# Patient Record
Sex: MALE | Race: WHITE | URBAN - METROPOLITAN AREA
[De-identification: names, ages, dates, MRNs, and addresses within clinical notes are randomized per-mention and may not be internally consistent; named-entity substitution may affect disease eponyms.]

---

## 2017-05-04 ENCOUNTER — OFFICE VISIT (OUTPATIENT)
Dept: FAMILY MEDICINE CLINIC | Age: 47
End: 2017-05-04

## 2017-05-04 VITALS
HEART RATE: 87 BPM | WEIGHT: 206.6 LBS | BODY MASS INDEX: 30.6 KG/M2 | DIASTOLIC BLOOD PRESSURE: 81 MMHG | TEMPERATURE: 98.4 F | SYSTOLIC BLOOD PRESSURE: 114 MMHG | HEIGHT: 69 IN | RESPIRATION RATE: 18 BRPM | OXYGEN SATURATION: 96 %

## 2017-05-04 DIAGNOSIS — Z23 ENCOUNTER FOR IMMUNIZATION: ICD-10-CM

## 2017-05-04 DIAGNOSIS — Z00.00 ANNUAL PHYSICAL EXAM: Primary | ICD-10-CM

## 2017-05-04 NOTE — PROGRESS NOTES
Chief Complaint   Patient presents with    Complete Physical     no concerns today. . just time to get a check up. . no med history. Mia harvey needed. . refused pneumo vaccine. .      1. Have you been to the ER, urgent care clinic since your last visit? Hospitalized since your last visit? No    2. Have you seen or consulted any other health care providers outside of the [address] [place] [address] since your last visit? Include any pap smears or colon screening.  No

## 2017-05-04 NOTE — MR AVS SNAPSHOT
Visit Information Date & Time Provider Department Dept. Phone Encounter #  
 5/4/2017  4:00 PM Jackson Coleman, Maninder Alcantar [phone] [id number] Upcoming Health Maintenance Date Due Pneumococcal 19-64 Medium Risk (1 of 1 - PPSV23) 8/26/1987 INFLUENZA AGE 9 TO ADULT 8/1/2017 DTaP/Tdap/Td series (2 - Td) 5/4/2027 Allergies as of 5/4/2017  Review Complete On: 5/4/2017 By: Jackson Jaffe MD  
 No Known Allergies Current Immunizations  Reviewed on 5/4/2017 Name Date Td, Adsorbed PF 6/16/2015 Tdap 5/4/2017 Reviewed by Stas Saldana LPN on 8/7/7548 at  5:27 PM  
You Were Diagnosed With   
  
 Codes Comments Annual physical exam    -  Primary ICD-10-CM: Z00.00 ICD-9-CM: V70.0 Encounter for immunization     ICD-10-CM: H54 ICD-9-CM: V03.89 Vitals BP Pulse Temp Resp Height(growth percentile) Weight(growth percentile) 114/81 87 98.4 °F (36.9 °C) (Oral) 18 5' 9\" (1.753 m) 206 lb 9.6 oz (93.7 kg) SpO2 BMI Smoking Status 96% 30.51 kg/m2 Current Every Day Smoker BMI and BSA Data Body Mass Index Body Surface Area 30.51 kg/m 2 2.14 m 2 Preferred Pharmacy Pharmacy Name Phone [facility] [place], [address] AT [facility] [phone] Your Updated Medication List  
  
   
This list is accurate as of: 5/4/17  4:45 PM.  Always use your most recent med list.  
  
  
  
  
 GOODY'S BODY PAIN 500-325 mg Pack Generic drug:  aspirin-acetaminophen Take  by mouth. We Performed the Following CBC W/O DIFF [72496 CPT(R)] LIPID PANEL [64665 CPT(R)] METABOLIC PANEL, COMPREHENSIVE [20263 CPT(R)] TETANUS, DIPHTHERIA TOXOIDS AND ACELLULAR PERTUSSIS VACCINE (TDAP), IN INDIVIDS. >=7, IM [id number] CPT(R)] TSH 3RD GENERATION [03869 CPT(R)] [facility]! Kyler Cooper introduces Clar[employer] patient portal. Now you can access parts of your medical record, email your doctor's office, and request medication refills online. 1. In your internet browser, go to [email or web address] 2. Click on the First Time User? Click Here link in the Sign In box. You will see the New Member Sign Up page. 3. Enter your Clariture Access Code exactly as it appears below. You will not need to use this code after youve completed the sign-up process. If you do not sign up before the expiration date, you must request a new code. · Clariture Access Code: [email or web address] Expires: 8/2/2017  4:45 PM 
 
4. Enter the last four digits of your Social Security Number (xxxx) and Date of Birth (mm/dd/yyyy) as indicated and click Submit. You will be taken to the next sign-up page. 5. Create a Clariture ID. This will be your Clariture login ID and cannot be changed, so think of one that is secure and easy to remember. 6. Create a Clariture password. You can change your password at any time. 7. Enter your Password Reset Question and Answer. This can be used at a later time if you forget your password. 8. Enter your e-mail address. You will receive e-mail notification when new information is available in [address] 9. Click Sign Up. You can now view and download portions of your medical record. 10. Click the Download Summary menu link to download a portable copy of your medical information. If you have questions, please visit the Frequently Asked Questions section of the Clariture website. Remember, Clariture is NOT to be used for urgent needs. For medical emergencies, dial 911. Now available from your iPhone and Android! Please provide this summary of care documentation to your next provider. Your primary care clinician is listed as Bel Hodgson. If you have any questions after today's visit, please call [phone].

## 2017-05-04 NOTE — PROGRESS NOTES
Venu Nguyễn is a 48 y.o. male who presents for annual physical.  No complaints except some stress related to his job closing in 45 days and he will be out of work. He reports walking for exercise. Sleep is usually good but harder time falling asleep recently due to stress from job issues. He smokes about 4-10 cigarettes a day. Drinks 3-8 beers about 4 days a week. He has never considered cutting back and does not feel as though the drinking is a problem. He feels his diet is not very healthy with mostly fast food but is trying to improve his diet. His wife is helping him with healthier choices. PMHx:  History reviewed. No pertinent past medical history. Meds:   Current Outpatient Prescriptions   Medication Sig Dispense Refill    aspirin-acetaminophen (GOODY'S BODY PAIN) 500-325 mg pack Take  by mouth.          Allergies:   No Known Allergies    Smoker:  History   Smoking Status    Current Every Day Smoker    Packs/day: 0.50   Smokeless Tobacco    Not on file       ETOH:   History   Alcohol Use    1.8 - 2.4 oz/week    3 - 4 Cans of beer per week       FH:   Family History   Problem Relation Age of Onset    Cancer Maternal Grandfather        ROS:  General/Constitutional:   No headache, fever, fatigue, weight loss or weight gain       Eyes:   No redness, pruritis, pain, visual changes, swelling, or discharge      Ears:    No pain, loss or changes in hearing     Neck:   No swelling, masses, stiffness, pain, or limited movement     Cardiac:    No chest pain      Respiratory:   No cough or shortness of breath     GI:   No nausea/vomiting, diarrhea, abdominal pain, bloody or dark stools       :   No dysuria or  hematuria    Neurological:   No loss of consciousness, dizziness, seizures, dysarthria, cognitive changes, memory changes,  problems with balance, or unilateral weakness     Skin: No rash     Physical Exam:  Visit Vitals    /81    Pulse 87    Temp 98.4 °F (36.9 °C) (Oral)    Resp 18    Ht 5' 9\" (1.753 m)    Wt 206 lb 9.6 oz (93.7 kg)    SpO2 96%    BMI 30.51 kg/m2     GEN: No apparent distress. Alert and oriented and responds to all questions appropriately. EYES:  Conjunctiva clear; pupils round and reactive to light; extraocular movements are intact. EAR: External ears are normal.  Tympanic membranes are clear and without effusion. NOSE: Turbinates are within normal limits. No drainage  OROPHYARYNX: No oral lesions or exudates. NECK:  Supple; no masses; thyroid normal           LUNGS: Respirations unlabored; clear to auscultation bilaterally  CARDIOVASCULAR: Regular, rate, and rhythm without murmurs, gallops or rubs   ABDOMEN: Soft; nontender; nondistended; normoactive bowel sounds; no masses or organomegaly  NEUROLOGIC:  No focal neurologic deficits. Strength and sensation grossly intact. Coordination and gait grossly intact. EXT: Well perfused. No edema. SKIN: No obvious rashes. Assessment:    ICD-10-CM ICD-9-CM    1. Annual physical exam Z00.00 V70.0 CBC W/O DIFF      METABOLIC PANEL, COMPREHENSIVE      LIPID PANEL      TSH 3RD GENERATION   2. Encounter for immunization Z23 V03.89 TETANUS, DIPHTHERIA TOXOIDS AND ACELLULAR PERTUSSIS VACCINE (TDAP), IN INDIVIDS. >=7, IM       Plan:  Labs as above. Discussed diet and exercise  Encouraged smoking cessation and cutting back on ETOH use. He states he is not ready to cut back on either and does not think that either are a problem currently. Discuss long term health issues regarding each and encouraged cessation. TDaP given    RTC: 1 year and PRN.

## 2020-05-07 ENCOUNTER — LAB ONLY (OUTPATIENT)
Dept: FAMILY MEDICINE CLINIC | Age: 50
End: 2020-05-07

## 2020-05-07 ENCOUNTER — TELEPHONE (OUTPATIENT)
Dept: FAMILY MEDICINE CLINIC | Age: 50
End: 2020-05-07

## 2020-05-07 ENCOUNTER — HOSPITAL ENCOUNTER (OUTPATIENT)
Dept: LAB | Age: 50
Discharge: HOME | End: 2020-05-07

## 2020-05-07 ENCOUNTER — VIRTUAL VISIT (OUTPATIENT)
Dept: FAMILY MEDICINE CLINIC | Age: 50
End: 2020-05-07

## 2020-05-07 DIAGNOSIS — M79.671 RIGHT FOOT PAIN: Primary | ICD-10-CM

## 2020-05-07 DIAGNOSIS — Z00.00 HEALTHCARE MAINTENANCE: ICD-10-CM

## 2020-05-07 DIAGNOSIS — M79.89 SWELLING OF RIGHT FOOT: ICD-10-CM

## 2020-05-07 LAB
ALBUMIN SERPL-MCNC: 4.1 G/DL (ref 3.5–5)
ALBUMIN/GLOB SERPL: 1.9 {RATIO} (ref 1.1–2.2)
ALP SERPL-CCNC: 80 U/L (ref 45–117)
ALT SERPL-CCNC: 24 U/L (ref 12–78)
ANION GAP SERPL CALC-SCNC: 2 MMOL/L (ref 5–15)
AST SERPL-CCNC: 12 U/L (ref 15–37)
BASOPHILS # BLD: 0 K/UL (ref 0–0.1)
BASOPHILS NFR BLD: 0 % (ref 0–1)
BILIRUB SERPL-MCNC: 0.5 MG/DL (ref 0.2–1)
BUN SERPL-MCNC: 18 MG/DL (ref 6–20)
BUN/CREAT SERPL: 18 (ref 12–20)
CALCIUM SERPL-MCNC: 9.1 MG/DL (ref 8.5–10.1)
CHLORIDE SERPL-SCNC: 106 MMOL/L (ref 97–108)
CHOLEST SERPL-MCNC: 169 MG/DL
CO2 SERPL-SCNC: 30 MMOL/L (ref 21–32)
CREAT SERPL-MCNC: 1 MG/DL (ref 0.7–1.3)
DIFFERENTIAL METHOD BLD: NORMAL
EOSINOPHIL # BLD: 0.2 K/UL (ref 0–0.4)
EOSINOPHIL NFR BLD: 4 % (ref 0–7)
ERYTHROCYTE [DISTWIDTH] IN BLOOD BY AUTOMATED COUNT: 12.5 % (ref 11.5–14.5)
EST. AVERAGE GLUCOSE BLD GHB EST-MCNC: 94 MG/DL
GLOBULIN SER CALC-MCNC: 2.2 G/DL (ref 2–4)
GLUCOSE SERPL-MCNC: 87 MG/DL (ref 65–100)
HBA1C MFR BLD: 4.9 % (ref 4–5.6)
HCT VFR BLD AUTO: 46.7 % (ref 36.6–50.3)
HDLC SERPL-MCNC: 47 MG/DL
HDLC SERPL: 3.6 {RATIO} (ref 0–5)
HGB BLD-MCNC: 15.5 G/DL (ref 12.1–17)
IMM GRANULOCYTES # BLD AUTO: 0 K/UL (ref 0–0.04)
IMM GRANULOCYTES NFR BLD AUTO: 0 % (ref 0–0.5)
LDLC SERPL CALC-MCNC: 75.6 MG/DL (ref 0–100)
LIPID PROFILE,FLP: ABNORMAL
LYMPHOCYTES # BLD: 1.5 K/UL (ref 0.8–3.5)
LYMPHOCYTES NFR BLD: 26 % (ref 12–49)
MCH RBC QN AUTO: 31.4 PG (ref 26–34)
MCHC RBC AUTO-ENTMCNC: 33.2 G/DL (ref 30–36.5)
MCV RBC AUTO: 94.5 FL (ref 80–99)
MONOCYTES # BLD: 0.5 K/UL (ref 0–1)
MONOCYTES NFR BLD: 8 % (ref 5–13)
NEUTS SEG # BLD: 3.5 K/UL (ref 1.8–8)
NEUTS SEG NFR BLD: 62 % (ref 32–75)
NRBC # BLD: 0 K/UL (ref 0–0.01)
NRBC BLD-RTO: 0 PER 100 WBC
PLATELET # BLD AUTO: 171 K/UL (ref 150–400)
PMV BLD AUTO: 10.4 FL (ref 8.9–12.9)
POTASSIUM SERPL-SCNC: 4.3 MMOL/L (ref 3.5–5.1)
PROT SERPL-MCNC: 6.3 G/DL (ref 6.4–8.2)
RBC # BLD AUTO: 4.94 M/UL (ref 4.1–5.7)
SODIUM SERPL-SCNC: 138 MMOL/L (ref 136–145)
TRIGL SERPL-MCNC: 232 MG/DL (ref ?–150)
VLDLC SERPL CALC-MCNC: 46.4 MG/DL
WBC # BLD AUTO: 5.6 K/UL (ref 4.1–11.1)

## 2020-05-07 NOTE — TELEPHONE ENCOUNTER
Reviewed Xray findings  - Advised patint to perform ice therapy: apply cold (wrap in wet thin towel an ice pack or bag of frozen ligia or immersed in 55 F cool water), Ice massage rubbing ice over heel in circular motion for 5-10 minutes hourly for 2-4 hours. - Relative rest, avoiding irritating activities,   - Orthotics  - New running shoes  - Dynamic stretches: Roll foot arch over Tennis Ball  - Plantar fascia stretch  - Achilles tendon stretches  - Towl curls      Tendinitis, plantar fasciitis, hell pad dysfunction tarsal tunnel syndrome    Soft tissues are normal. Bones are well-mineralized. Plantar and posterior  calcaneal spurring is noted.

## 2020-05-07 NOTE — PROGRESS NOTES
Yu Pena  44 y.o. male  1970  [address]   [id number]   [address]:    Telemedicine Progress Note  Lauren King MD       Encounter Date and Time: May 7, 2020 at 11:12 AM    Consent:  He and/or the health care decision maker is aware that that he may receive a bill for this telephone service, depending on his insurance coverage, and has provided verbal consent to proceed: Yes    No chief complaint on file. History of Present Illness   Tamar Gitelman is a 44 y.o. male was evaluated by synchronous (real-time) audio-video technology from home, through the [employer] Patient Portal.    Patient states that for the past couple of months has been having right foot pain. States he typically jumps from roof to roof and to the floor about 3-4 ft in his job. Unable to recall exact event that caused the foot pain. States pain has progressively become worse and is associated with swelling. Pain is now \"excrutiating\". Aggravated with walking and standing, relived with rest. States Ibuprofen 200 mg relieved pain however now has to take 200 mg of ibuprofen in addition to naproxen. States he feels the most pain between arch and ankle and occasionally on the heel. Tried taking one week off from work however has not improved pain. Denies any bruising or calf pain. Of note patient drinks about 40 beers per week. Covid screening: Patient denies any cough, sore throat, fever, recent travel or sick contacts. Patient works at [employer]. Review of Systems   Review of Systems   Constitutional: Negative for chills and fever. HENT: Negative for congestion and sore throat. Eyes: Negative for blurred vision and double vision. Respiratory: Negative for cough, hemoptysis, sputum production, shortness of breath and wheezing. Cardiovascular: Negative for chest pain, palpitations and leg swelling.    Gastrointestinal: Negative for abdominal pain, blood in stool, constipation, diarrhea, heartburn, melena, nausea and vomiting. Genitourinary: Negative for dysuria and urgency. Musculoskeletal: Negative for back pain and joint pain. Right foot pain and right foot swelling. Skin: Negative for rash. Neurological: Negative for dizziness, focal weakness and headaches. Psychiatric/Behavioral: Negative for suicidal ideas. Vitals/Objective:     General: alert, cooperative, no distress   Mental  status: mental status: alert, oriented to person, place, and time, normal mood, behavior, speech, dress, motor activity, and thought processes   Resp: resp: normal effort and no respiratory distress   Neuro: neuro: no gross deficits   Skin: skin: no discoloration or lesions of concern on visible areas   Due to this being a TeleHealth evaluation, many elements of the physical examination are unable to be assessed. Assessment and Plan:   Time-based coding, delete if not needed: I spent at least 15 minutes with this established patient, and >50% of the time was spent counseling and/or coordinating care regarding right foot pain and swelling. Assessment/Plan:  Right foot pain and swelling likely secondary to stress fracture:  - Scheduled lab/xray appointment for patient at 1 pm.   - XR FOOT RT MIN 3 V; Future  - XR ANKLE RT MIN 3 V; Future  - Will call patient with results and for plan moving forward. - Patient was advised to minimize NSAID use and reviewed potential side effects. 2. Healthcare maintenance  - Will obtain labs given patient will be going to clinic and to avoid additional clinic visit. Has never had labs before. - CBC WITH AUTOMATED DIFF; Future  - METABOLIC PANEL, COMPREHENSIVE; Future  - LIPID PANEL; Future  - HEMOGLOBIN A1C WITH EAG; Future    3. ETOH abuse: Patient reports drinking 40 beers per week. - Patient was extensively counseled on risks of ETOH consumption.  Was advised to refrain given increased risk of GI bleed with NSAID use. - Patient states it is not a problem and some days doesn't drink at all. Time spent in direct conversation with the patient to include medical condition(s) discussed, assessment and treatment plan:       We discussed the expected course, resolution and complications of the diagnosis(es) in detail. Medication risks, benefits, costs, interactions, and alternatives were discussed as indicated. I advised him to contact the office if his condition worsens, changes or fails to improve as anticipated. He expressed understanding with the diagnosis(es) and plan. Patient understands that this encounter was a temporary measure, and the importance of further follow up and examination was emphasized. Patient verbalized understanding. Patient informed to follow up: in one week. Patient was discussed with Dr. Cole Dewey, Attending Physician. Electronically Signed: Little Nesbitt MD    Providers location when delivering service ( home)    CPT Codes 48286-60494 for Established Patients may apply to this Telehealth Visit. POS code: 18. Modifier GT      Pursuant to the emergency declaration under the [facility], [facility] waiver authority and the [employer] and [employer] Act, this Virtual  Visit was conducted, with patient's consent, to reduce the patient's risk of exposure to COVID-19 and provide continuity of care for an established patient. Services were provided through a video synchronous discussion virtually to substitute for in-person clinic visit. History   Patients past medical, surgical and family histories were reviewed and updated. No past medical history on file. No past surgical history on file.   Family History   Problem Relation Age of Onset    Cancer Maternal Grandfather      Social History     Socioeconomic History    Marital status:      Spouse name: Not on file    Number of children: Not on file    Years of education: Not on file    Highest education level: Not on file   Occupational History    Not on file   Social Needs    Financial resource strain: Not on file    Food insecurity     Worry: Not on file     Inability: Not on file    Transportation needs     Medical: Not on file     Non-medical: Not on file   Tobacco Use    Smoking status: Current Every Day Smoker     Packs/day: 0.50   Substance and Sexual Activity    Alcohol use: Yes     Alcohol/week: 3.0 - 4.0 standard drinks     Types: 3 - 4 Cans of beer per week    Drug use: No    Sexual activity: Yes     Partners: Female   Lifestyle    Physical activity     Days per week: Not on file     Minutes per session: Not on file    Stress: Not on file   Relationships    Social connections     Talks on phone: Not on file     Gets together: Not on file     Attends Quaker service: Not on file     Active member of club or organization: Not on file     Attends meetings of clubs or organizations: Not on file     Relationship status: Not on file    Intimate partner violence     Fear of current or ex partner: Not on file     Emotionally abused: Not on file     Physically abused: Not on file     Forced sexual activity: Not on file   Other Topics Concern    Not on file   Social History Narrative    Not on file     There is no problem list on file for this patient. Current Medications/Allergies   Medications and Allergies reviewed:    Current Outpatient Medications   Medication Sig Dispense Refill    aspirin-acetaminophen (GOODY'S BODY PAIN) 500-325 mg pack Take  by mouth.        No Known Allergies

## 2020-05-15 ENCOUNTER — TELEPHONE (OUTPATIENT)
Dept: FAMILY MEDICINE CLINIC | Age: 50
End: 2020-05-15

## 2020-05-15 DIAGNOSIS — M72.2 PLANTAR FASCIITIS: Primary | ICD-10-CM

## 2020-05-15 RX ORDER — NAPROXEN 500 MG/1
500 TABLET ORAL 2 TIMES DAILY WITH MEALS
Qty: 20 TAB | Refills: 0 | Status: SHIPPED | OUTPATIENT
Start: 2020-05-15

## 2020-05-15 RX ORDER — ACETAMINOPHEN 325 MG/1
325 TABLET ORAL
Qty: 20 TAB | Refills: 0 | Status: SHIPPED | OUTPATIENT
Start: 2020-05-15

## 2020-05-15 NOTE — TELEPHONE ENCOUNTER
Spoke to patient. Has not received exercises in the mail yet, sent them via [employer]. Also requesting Aleve be sent to the pharmacy. Counseled him on the side effects of taking NSAIDs. Had been taking goody's body pain in addition to naproxen so told him to take tylenol if needed but to avoid taking the goody's as they also contain ibuprofen.

## 2020-05-15 NOTE — TELEPHONE ENCOUNTER
Patient wife, not listed on [employer] ( [facility]) calling,    She states you were suppose to send a medication to pharmacy for foot pain. She also states you were to send exercises to patient's my-chart.     Please call patient [phone], Mo Calderón

## 2020-06-11 NOTE — PROGRESS NOTES
I reviewed the patient's medical history, the resident's history, physical exam, assessment and plan. I discussed the findings and plan with resident and agree with these  as documented in the resident's note.

## 2021-08-20 ENCOUNTER — OFFICE VISIT (OUTPATIENT)
Dept: FAMILY MEDICINE CLINIC | Age: 51
End: 2021-08-20
Payer: COMMERCIAL

## 2021-08-20 VITALS
TEMPERATURE: 98.3 F | SYSTOLIC BLOOD PRESSURE: 113 MMHG | BODY MASS INDEX: 28.14 KG/M2 | OXYGEN SATURATION: 95 % | HEART RATE: 77 BPM | WEIGHT: 190 LBS | HEIGHT: 69 IN | DIASTOLIC BLOOD PRESSURE: 74 MMHG

## 2021-08-20 DIAGNOSIS — F10.10 ALCOHOL ABUSE: ICD-10-CM

## 2021-08-20 DIAGNOSIS — M20.002 FINGER DEFORMITY, ACQUIRED, LEFT: ICD-10-CM

## 2021-08-20 DIAGNOSIS — I87.2 VENOUS INSUFFICIENCY: Primary | ICD-10-CM

## 2021-08-20 PROCEDURE — 99213 OFFICE O/P EST LOW 20 MIN: CPT | Performed by: STUDENT IN AN ORGANIZED HEALTH CARE EDUCATION/TRAINING PROGRAM

## 2021-08-20 NOTE — PROGRESS NOTES
Subjective   Margaret Collet is an 44 y.o. male presents for swelling of right foot. Denies h/o pain, trauma, blood clots. 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). Was evaluated 1 yr ago with normal xrays. He does not recall having xrays done following the virtual visit because he says he drinks a lot. States he is here to discuss the foot only and not drinking. Smashed his right pinky finger against something at work about 3 months ago, thinks he fractured it, and immediately reduced it back into place. Since then, it has seemed to heal in a bent position. Review of Systems   Constitutional: Negative for fever  Musculoskeletal: swelling and deformity. No pain    Allergies   No Known Allergies    Medications  Current Outpatient Medications   Medication Sig    naproxen (NAPROSYN) 500 mg tablet Take 1 Tab by mouth two (2) times daily (with meals).  acetaminophen (TYLENOL) 325 mg tablet Take 1 Tab by mouth every six (6) hours as needed for Pain. No current facility-administered medications for this visit. Medical History  No past medical history on file. Immunizations   Immunization History   Administered Date(s) Administered    Td, Adsorbed PF 06/16/2015    Tdap 05/04/2017          No past surgical history on file. Family History   Problem Relation Age of Onset    Cancer Maternal Grandfather      Social History     Tobacco Use    Smoking status: Current Every Day Smoker     Packs/day: 0.50    Smokeless tobacco: Current User   Substance Use Topics    Alcohol use: Yes     Alcohol/week: 4.0 standard drinks     Types: 4 Cans of beer per week     Comment: 4 beers 4 days       Objective   Vital Signs  Visit Vitals  /74 (BP 1 Location: Left upper arm, BP Patient Position: Sitting)   Pulse 77   Temp 98.3 °F (36.8 °C) (Oral)   Ht 5' 9\" (1.753 m)   Wt 190 lb (86.2 kg)   SpO2 95%   BMI 28.06 kg/m²       Physical Exam   General appearance - Alert, NAD.    Musculoskeletal - minimal swelling covering 3/4 of right dorsal foot, no ankle or LE involvement. No pain with passive, active movement or palpation. Normal ROM. DP pulses 2+. Left pinky flexed at the PIP, no mallet finger or Boutonnière deformity of the pinky finger. Skin - blanchable purplish color of feet bilaterally    Assessment   Eric Limon is a 44 y.o. who presents for foot swelling     Plan     1. Venous insufficiency chronic swelling R foot for ~4 years. Does not extend to the calf- no concern for DVT of large vessels  - compression stockings, elevate foot    - LE duplex not indicated due to very distal involvement only indicating insufficiency of smaller distal veins    2. Alcohol abuse does not recall coming into office for Xrays following his virtual apt last year. Elevated triglycerides, LFTs nl in 2020. Declines further lab testing  - offered counseling, lab f/u if patient would like    3. Finger deformity, acquired, left flexion of PIP with straight DIP, s/p probable fracture 3 months ago   - counseled patient to seek care or splint early if it happens again in the future      I have discussed the aforementioned diagnoses and plan with the patient in detail. I have provided information in person and/or in AVS. All questions answered prior to discharge.       I discussed this patient with Dr. Lissett Atkinson (Attending Physician)   Signed By:  Uriah Patrick MD    Family Medicine Resident

## 2021-08-20 NOTE — PROGRESS NOTES
Chief Complaint   Patient presents with    Foot Swelling     Right swollen foot. Visit Vitals  /74 (BP 1 Location: Left upper arm, BP Patient Position: Sitting)   Pulse 77   Temp 98.3 °F (36.8 °C) (Oral)   Ht 5' 9\" (1.753 m)   Wt 190 lb (86.2 kg)   SpO2 95%   BMI 28.06 kg/m²   1. Have you been to the ER, urgent care clinic since your last visit? Hospitalized since your last visit? No    2. Have you seen or consulted any other health care providers outside of the [address] [place] [address] since your last visit? Include any pap smears or colon screening.  No

## 2021-08-20 NOTE — PATIENT INSTRUCTIONS
Learning About Venous Insufficiency  What is it? Venous insufficiency is a problem with the flow of blood from the veins of the legs back to the heart. It's also called chronic venous insufficiency or chronic venous stasis. Your veins bring blood back to the heart after it flows through your body. Veins have valves that keep the blood moving in one directiontoward the heart. But with venous insufficiency, the veins of the legs might not work as they should. This can allow blood to leak backward. Fluid can pool in the legs. This can lead to problems that include varicose veins. What causes it? Venous insufficiency is sometimes caused by deep vein thrombosis and high blood pressure inside leg veins. You are more likely to have venous insufficiency if you:  · Are older. · Are female. · Are overweight. · Don't get enough physical activity. · Smoke. · Have a family history of varicose veins. What are the symptoms? Symptoms of venous insufficiency affect the legs. They may include:  · Swelling, often in the ankles. · A rash. · Varicose veins. · Itching. · Cramping. · Skin sores (ulcers). · Aching or a feeling of heaviness. · Changes in skin color. How is it diagnosed? Your doctor can diagnose venous insufficiency by examining your legs and by using a type of ultrasound test (duplex Doppler) to find out how well blood is flowing in your legs. How is it treated? To reduce swelling and relieve pain caused by venous insufficiency, you can wear compression stockings. They are tighter at the ankles than at the top of the legs. They also can help venous skin ulcers heal. But there are different types of stockings, and they need to fit right. So your doctor will recommend what you need. You also can try to:  · Get more exercise, especially walking. It can increase blood flow. · Avoid standing still or sitting for a long time, which can make the fluid pool in your legs.   · Try not to sit with your legs crossed at the knee. · Keep your legs raised above your heart when you're lying down. This reduces swelling. If these treatments don't work, you may need medicine or a procedure to help relieve symptoms. Procedures might be done to close the vein, to remove the vein, or to improve blood flow. Follow-up care is a key part of your treatment and safety. Be sure to make and go to all appointments, and call your doctor if you are having problems. It's also a good idea to know your test results and keep a list of the medicines you take. Current as of: March 4, 2020               Content Version: 12.8  © 2006-2021 [employer]wise, [employer]. Care instructions adapted under license by [employer]) (which disclaims liability or warranty for this information). If you have questions about a medical condition or this instruction, always ask your healthcare professional. [address] any warranty or liability for your use of this information.

## 2021-09-22 ENCOUNTER — VIRTUAL VISIT (OUTPATIENT)
Dept: FAMILY MEDICINE CLINIC | Age: 51
End: 2021-09-22
Payer: COMMERCIAL

## 2021-09-22 DIAGNOSIS — Z11.52 ENCOUNTER FOR SCREENING FOR COVID-19: Primary | ICD-10-CM

## 2021-09-22 PROCEDURE — 99212 OFFICE O/P EST SF 10 MIN: CPT | Performed by: STUDENT IN AN ORGANIZED HEALTH CARE EDUCATION/TRAINING PROGRAM

## 2021-09-22 NOTE — PROGRESS NOTES
Felisha Pean  46 y.o. male  1970  [address] [facility]  [id number]   [address]:    Telemedicine Progress Note  [facility]       Encounter Date and Time: September 22, 2021 at 7:21 AM    Consent: Darleen Victoria, who was seen by synchronous (real-time) audio-video technology, and/or his healthcare decision maker, is aware that this patient-initiated, Telehealth encounter on 9/22/2021 is a billable service, with coverage as determined by his insurance carrier. He is aware that he may receive a bill and has provided verbal consent to proceed: Yes. Chief Complaint   Patient presents with   [address] test     History of Present Illness   Darleen Victoria is a 46 y.o. male was evaluated by synchronous (real-time) audio-video technology from Pt home, through a secure patient portal.    COVID screen  Traveling and needs covid test.  He is traveling across country and leaves Friday 9/24/21. He is going to [facility] and the resort he is staying in requires a COVID test.  It cannot be a rapid test.  It must be PCR. He had COVID in March 2021 but did not require hospitalization. He is currently asymptomatic. He has not received his covid vaccine. COVID screening: NEGATIVE. Pt denies sick contacts or recent travel. Denies recent illness, fever, chills, sore throat, vision changes, HA, dizziness, cough , SOB, chest pain, dysuria, n/v/d, abdominal pain or extremity edema. Review of Systems   Review of Systems   Constitutional: Negative for chills and fever. HENT: Negative for congestion, ear pain and sore throat. Eyes: Negative for discharge. Respiratory: Negative for cough, sputum production, shortness of breath and wheezing. Cardiovascular: Negative for chest pain. Gastrointestinal: Negative for nausea and vomiting. Skin: Negative for rash. Neurological: Negative for dizziness and headaches. Vitals/Objective:     General: alert, cooperative, no distress   Mental  status: mental status: alert, oriented to person, place, and time, normal mood, behavior, speech, dress, motor activity, and thought processes   Resp: resp: normal effort and no respiratory distress   Neuro: neuro: no gross deficits   Skin: skin: no discoloration or lesions of concern on visible areas   Due to this being a TeleHealth evaluation, many elements of the physical examination are unable to be assessed. Assessment and Plan:   Time-based coding, delete if not needed: I spent at least 15 minutes with this established patient, and >50% of the time was spent counseling and/or coordinating care regarding COVID testing    1. Encounter for screening for COVID-19  Needs testing for travel. Must be PCR test.  Not vaccinated for COVID. Works for [employer] and gets tested all the time. Currently asymptomatic.    - scheduled for testing 9/23/21 at 9:45AM- Pt aware   - NOVEL CORONAVIRUS (COVID-19)          Time spent in direct conversation with the patient to include medical condition(s) discussed, assessment and treatment plan:        We discussed the expected course, resolution and complications of the diagnosis(es) in detail. Medication risks, benefits, costs, interactions, and alternatives were discussed as indicated. I advised him to contact the office if his condition worsens, changes or fails to improve as anticipated. He expressed understanding with the diagnosis(es) and plan. Patient understands that this encounter was a temporary measure, and the importance of further follow up and examination was emphasized. Patient verbalized understanding. Patient informed to follow up: tomorrow 9:45AM for testing. Electronically Signed: DO Faheem Vasquezjoo Ohara is a 46 y.o. male who was evaluated by an audio-video encounter for concerns as above.  Patient identification was verified prior to start of the visit. A caregiver was present when appropriate. Due to this being a TeleHealth encounter (During SFGIG-91 public health emergency), evaluation of the following organ systems was limited: Vitals/Constitutional/EENT/Resp/CV/GI//MS/Neuro/Skin/Heme-Lymph-Imm. Pursuant to the emergency declaration under the [address] [place] [address] [facility] authority and the [employer] Act, this Virtual Visit was conducted, with patient's (and/or legal guardian's) consent, to reduce the patient's risk of exposure to COVID-19 and provide necessary medical care. Services were provided through a synchronous discussion virtually to substitute for in-person clinic visit. I was at home. The patient was at home. History   Patients past medical, surgical and family histories were reviewed and updated. No past medical history on file. No past surgical history on file. Family History   Problem Relation Age of Onset    Cancer Maternal Grandfather      Social History     Tobacco Use    Smoking status: Current Every Day Smoker     Packs/day: 0.50    Smokeless tobacco: Current User   Substance Use Topics    Alcohol use: Yes     Alcohol/week: 4.0 standard drinks     Types: 4 Cans of beer per week     Comment: 4 beers 4 days    Drug use: No     Patient Active Problem List   Diagnosis Code    Alcohol abuse F10.10          Current Medications/Allergies   Medications and Allergies reviewed:    Current Outpatient Medications   Medication Sig Dispense Refill    naproxen (NAPROSYN) 500 mg tablet Take 1 Tab by mouth two (2) times daily (with meals). 20 Tab 0    acetaminophen (TYLENOL) 325 mg tablet Take 1 Tab by mouth every six (6) hours as needed for Pain.  20 Tab 0     No Known Allergies

## 2021-09-23 ENCOUNTER — CLINICAL SUPPORT (OUTPATIENT)
Dept: FAMILY MEDICINE CLINIC | Age: 51
End: 2021-09-23

## 2021-09-23 DIAGNOSIS — Z11.52 ENCOUNTER FOR SCREENING FOR COVID-19: Primary | ICD-10-CM

## 2021-09-24 LAB
SARS-COV-2, NAA 2 DAY TAT: NORMAL
SARS-COV-2, NAA: NOT DETECTED

## 2022-03-19 PROBLEM — F10.10 ALCOHOL ABUSE: Status: ACTIVE | Noted: 2021-08-20

## 2022-12-27 ENCOUNTER — NURSE TRIAGE (OUTPATIENT)
Dept: OTHER | Facility: CLINIC | Age: 52
End: 2022-12-27

## 2022-12-27 NOTE — TELEPHONE ENCOUNTER
[facility]    Received call from Heath Brown at [facility] with Red Flag Complaint. Subjective: Caller states \"I am trying to get physical blood work. I am also interested in Viagra. About a week or so ago I had a bad tummy issue. Ever since I had the tummy issue it started to get better and for 3 days straight everything I ate I had to throw it up. My lower chest, my rib cage down and into my tummy is sore and achy. I can't get comfortable. \"     Current Symptoms: constant center of lower rib cage pain/ upper abdomen, no BM in 7 days   *Current smoker     Onset: 2 days ago; improving    Associated Symptoms: NA    Pain Severity: 4/10; dull; constant changes in severity     Temperature: 101 - the other day, no fever today. What has been tried: ginger ale, goodies,       Recommended disposition: Go to ED Now caller politely refuses ER. Care advice provided, patient verbalizes understanding; denies any other questions or concerns; instructed to call back for any new or worsening symptoms. Transferred to [address] [place] [address] in the office. Attention Provider: Thank you for allowing me to participate in the care of your patient. The patient was connected to triage in response to information provided to the [facility]. Please do not respond through this encounter as the response is not directed to a shared pool.     Reason for Disposition   Pain is mainly in upper abdomen (if needed ask: 'is it mainly above the belly button?')   Pain lasting > 10 minutes and over 48years old    Protocols used: Abdominal Pain - Male-ADULT-[place], Abdominal Pain - Upper-ADULT-[place]

## 2022-12-28 ENCOUNTER — OFFICE VISIT (OUTPATIENT)
Dept: FAMILY MEDICINE CLINIC | Age: 52
End: 2022-12-28
Payer: COMMERCIAL

## 2022-12-28 VITALS
HEART RATE: 80 BPM | RESPIRATION RATE: 16 BRPM | WEIGHT: 192 LBS | BODY MASS INDEX: 28.35 KG/M2 | OXYGEN SATURATION: 98 % | DIASTOLIC BLOOD PRESSURE: 74 MMHG | SYSTOLIC BLOOD PRESSURE: 109 MMHG

## 2022-12-28 DIAGNOSIS — R10.13 EPIGASTRIC PAIN: Primary | ICD-10-CM

## 2022-12-28 NOTE — PROGRESS NOTES
No chief complaint on file. 1. Have you been to the ER, urgent care clinic since your last visit? Hospitalized since your last visit? No    2. Have you seen or consulted any other health care providers outside of the [address] [place] [address] since your last visit? Include any pap smears or colon screening.  No

## 2022-12-29 LAB
ALBUMIN SERPL-MCNC: 3.9 G/DL (ref 3.5–5)
ALBUMIN/GLOB SERPL: 1.6 {RATIO} (ref 1.1–2.2)
ALP SERPL-CCNC: 82 U/L (ref 45–117)
ALT SERPL-CCNC: 27 U/L (ref 12–78)
ANION GAP SERPL CALC-SCNC: 5 MMOL/L (ref 5–15)
AST SERPL-CCNC: 14 U/L (ref 15–37)
BASOPHILS # BLD: 0 K/UL (ref 0–0.1)
BASOPHILS NFR BLD: 0 % (ref 0–1)
BILIRUB SERPL-MCNC: 0.6 MG/DL (ref 0.2–1)
BUN SERPL-MCNC: 17 MG/DL (ref 6–20)
BUN/CREAT SERPL: 20 (ref 12–20)
CALCIUM SERPL-MCNC: 9 MG/DL (ref 8.5–10.1)
CHLORIDE SERPL-SCNC: 104 MMOL/L (ref 97–108)
CHOLEST SERPL-MCNC: 152 MG/DL
CO2 SERPL-SCNC: 31 MMOL/L (ref 21–32)
CREAT SERPL-MCNC: 0.86 MG/DL (ref 0.7–1.3)
DIFFERENTIAL METHOD BLD: NORMAL
EOSINOPHIL # BLD: 0.2 K/UL (ref 0–0.4)
EOSINOPHIL NFR BLD: 3 % (ref 0–7)
ERYTHROCYTE [DISTWIDTH] IN BLOOD BY AUTOMATED COUNT: 11.9 % (ref 11.5–14.5)
EST. AVERAGE GLUCOSE BLD GHB EST-MCNC: 103 MG/DL
GLOBULIN SER CALC-MCNC: 2.4 G/DL (ref 2–4)
GLUCOSE SERPL-MCNC: 82 MG/DL (ref 65–100)
HBA1C MFR BLD: 5.2 % (ref 4–5.6)
HCT VFR BLD AUTO: 47.8 % (ref 36.6–50.3)
HCV AB SERPL QL IA: NONREACTIVE
HDLC SERPL-MCNC: 47 MG/DL
HDLC SERPL: 3.2 {RATIO} (ref 0–5)
HGB BLD-MCNC: 16.6 G/DL (ref 12.1–17)
IMM GRANULOCYTES # BLD AUTO: 0 K/UL (ref 0–0.04)
IMM GRANULOCYTES NFR BLD AUTO: 0 % (ref 0–0.5)
LDLC SERPL CALC-MCNC: 89.6 MG/DL (ref 0–100)
LIPASE SERPL-CCNC: 104 U/L (ref 73–393)
LYMPHOCYTES # BLD: 1.5 K/UL (ref 0.8–3.5)
LYMPHOCYTES NFR BLD: 28 % (ref 12–49)
MCH RBC QN AUTO: 33.4 PG (ref 26–34)
MCHC RBC AUTO-ENTMCNC: 34.7 G/DL (ref 30–36.5)
MCV RBC AUTO: 96.2 FL (ref 80–99)
MONOCYTES # BLD: 0.5 K/UL (ref 0–1)
MONOCYTES NFR BLD: 10 % (ref 5–13)
NEUTS SEG # BLD: 3.1 K/UL (ref 1.8–8)
NEUTS SEG NFR BLD: 59 % (ref 32–75)
NRBC # BLD: 0 K/UL (ref 0–0.01)
NRBC BLD-RTO: 0 PER 100 WBC
PLATELET # BLD AUTO: 222 K/UL (ref 150–400)
PMV BLD AUTO: 10.2 FL (ref 8.9–12.9)
POTASSIUM SERPL-SCNC: 4.5 MMOL/L (ref 3.5–5.1)
PROT SERPL-MCNC: 6.3 G/DL (ref 6.4–8.2)
RBC # BLD AUTO: 4.97 M/UL (ref 4.1–5.7)
SODIUM SERPL-SCNC: 140 MMOL/L (ref 136–145)
TRIGL SERPL-MCNC: 77 MG/DL (ref ?–150)
VLDLC SERPL CALC-MCNC: 15.4 MG/DL
WBC # BLD AUTO: 5.2 K/UL (ref 4.1–11.1)

## 2023-01-03 ENCOUNTER — HOSPITAL ENCOUNTER (OUTPATIENT)
Dept: CT IMAGING | Age: 53
Discharge: HOME | End: 2023-01-03
Attending: STUDENT IN AN ORGANIZED HEALTH CARE EDUCATION/TRAINING PROGRAM
Payer: COMMERCIAL

## 2023-01-03 DIAGNOSIS — R10.13 EPIGASTRIC PAIN: ICD-10-CM

## 2023-01-03 PROCEDURE — 74177 CT ABD & PELVIS W/CONTRAST: CPT

## 2023-01-03 PROCEDURE — 74011000636 HC RX REV CODE- 636: Performed by: STUDENT IN AN ORGANIZED HEALTH CARE EDUCATION/TRAINING PROGRAM

## 2023-01-03 RX ADMIN — IOPAMIDOL 100 ML: 755 INJECTION, SOLUTION INTRAVENOUS at 14:54

## 2023-01-04 ENCOUNTER — TELEPHONE (OUTPATIENT)
Dept: FAMILY MEDICINE CLINIC | Age: 53
End: 2023-01-04

## 2023-01-04 ENCOUNTER — OFFICE VISIT (OUTPATIENT)
Dept: FAMILY MEDICINE CLINIC | Age: 53
End: 2023-01-04
Payer: COMMERCIAL

## 2023-01-04 VITALS
HEIGHT: 69 IN | BODY MASS INDEX: 27.74 KG/M2 | OXYGEN SATURATION: 98 % | SYSTOLIC BLOOD PRESSURE: 105 MMHG | TEMPERATURE: 98.4 F | HEART RATE: 67 BPM | RESPIRATION RATE: 18 BRPM | DIASTOLIC BLOOD PRESSURE: 65 MMHG | WEIGHT: 187.25 LBS

## 2023-01-04 DIAGNOSIS — K80.21 CALCULUS OF GALLBLADDER WITH BILIARY OBSTRUCTION BUT WITHOUT CHOLECYSTITIS: Primary | ICD-10-CM

## 2023-01-04 PROCEDURE — 99213 OFFICE O/P EST LOW 20 MIN: CPT | Performed by: STUDENT IN AN ORGANIZED HEALTH CARE EDUCATION/TRAINING PROGRAM

## 2023-01-04 NOTE — PROGRESS NOTES
Chief Complaint   Patient presents with    Follow-up     Visit Vitals  /65 (BP 1 Location: Left upper arm, BP Patient Position: Sitting, BP Cuff Size: Adult)   Pulse 67   Temp 98.4 °F (36.9 °C) (Temporal)   Resp 18   Ht 5' 9\" (1.753 m)   Wt 187 lb 4 oz (84.9 kg)   SpO2 98%   BMI 27.65 kg/m²     1. Have you been to the ER, urgent care clinic since your last visit? Hospitalized since your last visit? No    2. Have you seen or consulted any other health care providers outside of the [address] [place] [address] since your last visit? Include any pap smears or colon screening.  No

## 2023-01-04 NOTE — TELEPHONE ENCOUNTER
Felicitas from [facility] called to see if a report has been received for this patient that had a CT done on 1/3. Would like to be contacted at your earliest convenience at [phone] if the report has been received or not. Thanks!

## 2023-05-15 ENCOUNTER — TELEPHONE (OUTPATIENT)
Age: 53
End: 2023-05-15

## 2023-05-15 NOTE — TELEPHONE ENCOUNTER
----- Message from Oj Posada sent at 5/15/2023 10:06 AM EDT -----  Subject: Hospital Follow Up    QUESTIONS  [facility]  Date of Discharge? 2023-05-15  Discharge Location? Home  Reason for hospitalization as patient stated? What question does the patient have, if applicable?   ---------------------------------------------------------------------------  --------------  CALL BACK INFO  What is the best way for the office to contact you? OK to leave message on   voicemail  Preferred Call Back Phone Number? [phone]  ---------------------------------------------------------------------------  --------------  SCRIPT ANSWERS  Relationship to Patient? Covered Entity  Covered Entity Type? Hospital?  Representative Name?  [facility]

## 2024-06-20 NOTE — PROGRESS NOTES
I reviewed with the resident the medical history and the resident's findings on the physical examination. I discussed with the resident the patient's diagnosis and concur with the plan. Negative

## 2025-07-26 ENCOUNTER — APPOINTMENT (OUTPATIENT)
Facility: HOSPITAL | Age: 55
End: 2025-07-26
Payer: COMMERCIAL

## 2025-07-26 ENCOUNTER — HOSPITAL ENCOUNTER (EMERGENCY)
Facility: HOSPITAL | Age: 55
Discharge: HOME | End: 2025-07-26
Attending: EMERGENCY MEDICINE
Payer: COMMERCIAL

## 2025-07-26 VITALS
RESPIRATION RATE: 18 BRPM | DIASTOLIC BLOOD PRESSURE: 93 MMHG | OXYGEN SATURATION: 98 % | BODY MASS INDEX: 30.06 KG/M2 | HEART RATE: 79 BPM | SYSTOLIC BLOOD PRESSURE: 157 MMHG | WEIGHT: 210 LBS | TEMPERATURE: 97.6 F | HEIGHT: 70 IN

## 2025-07-26 DIAGNOSIS — R10.9 ACUTE RIGHT FLANK PAIN: ICD-10-CM
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
## 2025-07-26 DIAGNOSIS — M25.511 ACUTE PAIN OF RIGHT SHOULDER: ICD-10-CM

## 2025-07-26 DIAGNOSIS — S22.41XA CLOSED FRACTURE OF MULTIPLE RIBS OF RIGHT SIDE, INITIAL ENCOUNTER: ICD-10-CM

## 2025-07-26 DIAGNOSIS — R07.9 RIGHT-SIDED CHEST PAIN: ICD-10-CM

## 2025-07-26 LAB
ANION GAP SERPL CALC-SCNC: 10 MMOL/L (ref 2–12)
BASOPHILS # BLD: 0.02 K/UL (ref 0–0.1)
BASOPHILS NFR BLD: 0.3 % (ref 0–1)
BUN SERPL-MCNC: 16 MG/DL (ref 6–20)
BUN/CREAT SERPL: 14 (ref 12–20)
CALCIUM SERPL-MCNC: 8.9 MG/DL (ref 8.5–10.1)
CHLORIDE SERPL-SCNC: 106 MMOL/L (ref 97–108)
CO2 SERPL-SCNC: 26 MMOL/L (ref 21–32)
CREAT SERPL-MCNC: 1.13 MG/DL (ref 0.7–1.3)
DIFFERENTIAL METHOD BLD: NORMAL
EOSINOPHIL # BLD: 0.05 K/UL (ref 0–0.4)
EOSINOPHIL NFR BLD: 0.8 % (ref 0–7)
ERYTHROCYTE [DISTWIDTH] IN BLOOD BY AUTOMATED COUNT: 12.4 % (ref 11.5–14.5)
GLUCOSE SERPL-MCNC: 118 MG/DL (ref 65–100)
HCT VFR BLD AUTO: 43.2 % (ref 36.6–50.3)
HGB BLD-MCNC: 15.3 G/DL (ref 12.1–17)
IMM GRANULOCYTES # BLD AUTO: 0.02 K/UL (ref 0–0.04)
IMM GRANULOCYTES NFR BLD AUTO: 0.3 % (ref 0–0.5)
LYMPHOCYTES # BLD: 1.11 K/UL (ref 0.8–3.5)
LYMPHOCYTES NFR BLD: 18 % (ref 12–49)
MCH RBC QN AUTO: 32 PG (ref 26–34)
MCHC RBC AUTO-ENTMCNC: 35.4 G/DL (ref 30–36.5)
MCV RBC AUTO: 90.4 FL (ref 80–99)
MONOCYTES # BLD: 0.63 K/UL (ref 0–1)
MONOCYTES NFR BLD: 10.2 % (ref 5–13)
NEUTS SEG # BLD: 4.34 K/UL (ref 1.8–8)
NEUTS SEG NFR BLD: 70.4 % (ref 32–75)
NRBC # BLD: 0 K/UL (ref 0–0.01)
NRBC BLD-RTO: 0 PER 100 WBC
PLATELET # BLD AUTO: 165 K/UL (ref 150–400)
PMV BLD AUTO: 10.2 FL (ref 8.9–12.9)
POTASSIUM SERPL-SCNC: 4.6 MMOL/L (ref 3.5–5.1)
RBC # BLD AUTO: 4.78 M/UL (ref 4.1–5.7)
SODIUM SERPL-SCNC: 142 MMOL/L (ref 136–145)
WBC # BLD AUTO: 6.2 K/UL (ref 4.1–11.1)

## 2025-07-26 PROCEDURE — 6360000004 HC RX CONTRAST MEDICATION: Performed by: PHYSICIAN ASSISTANT

## 2025-07-26 PROCEDURE — 6360000002 HC RX W HCPCS: Performed by: EMERGENCY MEDICINE

## 2025-07-26 PROCEDURE — 99285 EMERGENCY DEPT VISIT HI MDM: CPT

## 2025-07-26 PROCEDURE — 6370000000 HC RX 637 (ALT 250 FOR IP): Performed by: PHYSICIAN ASSISTANT

## 2025-07-26 PROCEDURE — 70450 CT HEAD/BRAIN W/O DYE: CPT

## 2025-07-26 PROCEDURE — 6360000002 HC RX W HCPCS: Performed by: PHYSICIAN ASSISTANT

## 2025-07-26 PROCEDURE — 80048 BASIC METABOLIC PNL TOTAL CA: CPT

## 2025-07-26 PROCEDURE — 36415 COLL VENOUS BLD VENIPUNCTURE: CPT

## 2025-07-26 PROCEDURE — 71260 CT THORAX DX C+: CPT

## 2025-07-26 PROCEDURE — 96374 THER/PROPH/DIAG INJ IV PUSH: CPT

## 2025-07-26 PROCEDURE — 85025 COMPLETE CBC W/AUTO DIFF WBC: CPT

## 2025-07-26 PROCEDURE — 96375 TX/PRO/DX INJ NEW DRUG ADDON: CPT

## 2025-07-26 PROCEDURE — 72125 CT NECK SPINE W/O DYE: CPT

## 2025-07-26 RX ORDER — ONDANSETRON 2 MG/ML
4 INJECTION INTRAMUSCULAR; INTRAVENOUS
Status: COMPLETED | OUTPATIENT
Start: 2025-07-26 | End: 2025-07-26

## 2025-07-26 RX ORDER — IBUPROFEN 600 MG/1
600 TABLET, FILM COATED ORAL EVERY 6 HOURS PRN
Qty: 28 TABLET | Refills: 0 | Status: SHIPPED | OUTPATIENT
Start: 2025-07-26 | End: 2025-07-26 | Stop reason: CLARIF

## 2025-07-26 RX ORDER — LIDOCAINE 4 G/G
1 PATCH TOPICAL
Status: DISCONTINUED | OUTPATIENT
Start: 2025-07-26 | End: 2025-07-26 | Stop reason: HOSPADM

## 2025-07-26 RX ORDER — ACETAMINOPHEN 500 MG
1000 TABLET ORAL EVERY 6 HOURS PRN
Qty: 56 TABLET | Refills: 0 | Status: SHIPPED | OUTPATIENT
Start: 2025-07-26 | End: 2025-08-02

## 2025-07-26 RX ORDER — MORPHINE SULFATE 10 MG/ML
6 INJECTION, SOLUTION INTRAMUSCULAR; INTRAVENOUS
Status: COMPLETED | OUTPATIENT
Start: 2025-07-26 | End: 2025-07-26

## 2025-07-26 RX ORDER — IOPAMIDOL 755 MG/ML
100 INJECTION, SOLUTION INTRAVASCULAR
Status: COMPLETED | OUTPATIENT
Start: 2025-07-26 | End: 2025-07-26

## 2025-07-26 RX ORDER — OXYCODONE AND ACETAMINOPHEN 5; 325 MG/1; MG/1
1 TABLET ORAL EVERY 6 HOURS PRN
Qty: 20 TABLET | Refills: 0 | Status: SHIPPED | OUTPATIENT
Start: 2025-07-26 | End: 2025-07-29

## 2025-07-26 RX ORDER — KETOROLAC TROMETHAMINE 10 MG/1
10 TABLET, FILM COATED ORAL EVERY 6 HOURS PRN
Qty: 30 TABLET | Refills: 0 | Status: SHIPPED | OUTPATIENT
Start: 2025-07-26

## 2025-07-26 RX ORDER — LIDOCAINE 50 MG/G
1 PATCH TOPICAL DAILY
Qty: 10 PATCH | Refills: 0 | Status: SHIPPED | OUTPATIENT
Start: 2025-07-26 | End: 2025-08-05

## 2025-07-26 RX ORDER — OMEPRAZOLE 40 MG/1
CAPSULE, DELAYED RELEASE ORAL
COMMUNITY
Start: 2025-05-16

## 2025-07-26 RX ORDER — ONDANSETRON 4 MG/1
4 TABLET, ORALLY DISINTEGRATING ORAL 3 TIMES DAILY PRN
Qty: 21 TABLET | Refills: 0 | Status: SHIPPED | OUTPATIENT
Start: 2025-07-26

## 2025-07-26 RX ADMIN — ONDANSETRON 4 MG: 2 INJECTION, SOLUTION INTRAMUSCULAR; INTRAVENOUS at 14:17

## 2025-07-26 RX ADMIN — MORPHINE SULFATE 6 MG: 10 INJECTION INTRAVENOUS at 13:11

## 2025-07-26 RX ADMIN — IOPAMIDOL 100 ML: 755 INJECTION, SOLUTION INTRAVENOUS at 13:32

## 2025-07-26 ASSESSMENT — PAIN DESCRIPTION - DESCRIPTORS
DESCRIPTORS: ACHING
DESCRIPTORS: ACHING

## 2025-07-26 ASSESSMENT — PAIN DESCRIPTION - ONSET
ONSET: SUDDEN
ONSET: SUDDEN

## 2025-07-26 ASSESSMENT — PAIN DESCRIPTION - ORIENTATION
ORIENTATION: RIGHT
ORIENTATION: RIGHT

## 2025-07-26 ASSESSMENT — PAIN DESCRIPTION - PAIN TYPE
TYPE: ACUTE PAIN
TYPE: ACUTE PAIN

## 2025-07-26 ASSESSMENT — PAIN SCALES - GENERAL
PAINLEVEL_OUTOF10: 10
PAINLEVEL_OUTOF10: 8

## 2025-07-26 ASSESSMENT — PAIN - FUNCTIONAL ASSESSMENT
PAIN_FUNCTIONAL_ASSESSMENT: 0-10
PAIN_FUNCTIONAL_ASSESSMENT: PREVENTS OR INTERFERES SOME ACTIVE ACTIVITIES AND ADLS
PAIN_FUNCTIONAL_ASSESSMENT: PREVENTS OR INTERFERES SOME ACTIVE ACTIVITIES AND ADLS

## 2025-07-26 ASSESSMENT — PAIN DESCRIPTION - FREQUENCY
FREQUENCY: CONTINUOUS
FREQUENCY: CONTINUOUS

## 2025-07-26 ASSESSMENT — PAIN DESCRIPTION - LOCATION
LOCATION: BACK;SHOULDER;CHEST
LOCATION: BACK;SHOULDER;CHEST
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
## 2025-07-26 NOTE — DISCHARGE INSTRUCTIONS
Continue to use incentive spirometer as prescribed for the next 1 to 2 weeks.  You can rotate Tylenol and ibuprofen every 4 hours as needed for pain.  Lidocaine patch 12 hours on 12 hours off.  Ice pack 10 to 20 minutes a few times a day to areas of pain for the next 5 days.  You can then rotate heat and ice.  Take medication as prescribed.  Return immediately if any new or worsening symptoms.  Thank you for allowing us to be a part of your care.

## 2025-07-26 NOTE — ED NOTES
Patient provided education on incentive spirometry. Patient has used previously, demonstrated understanding. Patient also provided education on using pillow to \"splint\" against his side for comfort. Patient verbalized understand to not use back brace that he came in wearing today.
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
